# Patient Record
Sex: MALE | Race: WHITE
[De-identification: names, ages, dates, MRNs, and addresses within clinical notes are randomized per-mention and may not be internally consistent; named-entity substitution may affect disease eponyms.]

---

## 2019-12-30 ENCOUNTER — HOSPITAL ENCOUNTER (EMERGENCY)
Dept: HOSPITAL 25 - UCEAST | Age: 54
Discharge: HOME | End: 2019-12-30
Payer: COMMERCIAL

## 2019-12-30 VITALS — SYSTOLIC BLOOD PRESSURE: 150 MMHG | DIASTOLIC BLOOD PRESSURE: 87 MMHG

## 2019-12-30 DIAGNOSIS — I10: ICD-10-CM

## 2019-12-30 DIAGNOSIS — J18.9: Primary | ICD-10-CM

## 2019-12-30 PROCEDURE — 71046 X-RAY EXAM CHEST 2 VIEWS: CPT

## 2019-12-30 PROCEDURE — G0463 HOSPITAL OUTPT CLINIC VISIT: HCPCS

## 2019-12-30 PROCEDURE — 99212 OFFICE O/P EST SF 10 MIN: CPT

## 2019-12-30 NOTE — UC
Respiratory Complaint HPI





- HPI Summary


HPI Summary: 


54-year-old male comes in with a chief complaint of cough chest congestion and 

chills.  Patient's had upper respiratory tract infection symptoms for more than 

a month.  He was treated with Augmentin which did improve his symptoms over the 

never went away completely.  Last 2 days he started with all of the above 

symptoms.  Feels the congestion worse on the left side of his chest.  He does 

feel somewhat short of breath.  Denies any wheezing.





- History of Current Complaint


Chief Complaint: UCRespiratory


Stated Complaint: COUGH,CONGESTION


Time Seen by Provider: 12/30/19 21:18


Pain Intensity: 0





- Allergies/Home Medications


Allergies/Adverse Reactions: 


 Allergies











Allergy/AdvReac Type Severity Reaction Status Date / Time


 


No Known Allergies Allergy   Verified 12/30/19 21:26











Home Medications: 


 Home Medications





Dm/Acetaminophen/Doxylamine [Vicks Nyquil Cold & Flu N 15-6. mg] 1 cap PO 

QPM PRN 12/30/19 [History Confirmed 12/30/19]


Guaifenesin/Dextromethorphan [Mucinex Dm -30 mg Tablet] 1 tab PO Q12H 12/ 30/19 [History Confirmed 12/30/19]











PMH/Surg Hx/FS Hx/Imm Hx


Previously Healthy: Yes


Cardiovascular History: Hypertension





- Surgical History


Surgical History: None





- Family History


Known Family History: Positive: Non-Contributory





- Social History


Alcohol Use: Occasionally


Substance Use Type: None


Smoking Status (MU): Never Smoked Tobacco





Review of Systems


All Other Systems Reviewed And Are Negative: Yes


Constitutional: Positive: Fever, Other - SEE HPI


Skin: Positive: Negative


Eyes: Positive: Negative


ENT: Positive: Sore Throat, Nasal Discharge, Sinus Congestion


Respiratory: Positive: Shortness Of Breath, Cough, Other - SEE HPI


Cardiovascular: Positive: Negative


Gastrointestinal: Positive: Negative


Motor: Positive: Negative


Neurovascular: Positive: Negative


Musculoskeletal: Positive: Negative


Neurological: Positive: Negative


Psychological: Positive: Negative


Is Patient Immunocompromised?: No





Physical Exam


Triage Information Reviewed: Yes


Appearance: No Pain Distress, Well-Nourished, Ill-Appearing - MILD


Vital Signs: 


 Initial Vital Signs











Temp  100.2 F   12/30/19 21:21


 


Pulse  120   12/30/19 21:21


 


Resp  24   12/30/19 21:21


 


BP  150/87   12/30/19 21:21


 


Pulse Ox  95   12/30/19 21:21











Vital Signs Reviewed: Yes


Eye Exam: Normal


Eyes: Positive: Conjunctiva Clear


ENT: Positive: Pharyngeal erythema, Nasal congestion, Nasal drainage, TMs normal


Neck: Positive: Supple


Respiratory: Positive: Lungs clear, Normal breath sounds, No respiratory 

distress


Cardiovascular: Positive: Tachycardia


Musculoskeletal: Positive: Strength Intact, ROM Intact


Neurological: Positive: Alert, Muscle Tone Normal


Psychological: Positive: Age Appropriate Behavior


Skin Exam: Normal





Respiratory Course/Dx





- Course


Course Of Treatment: 


I discussed the chest x-ray with the patient.  I see an infiltrate on the right 

side.  Radiologist reading is pending.  Patient is not a smoker and so I'll 

treat patient treatment of pneumonia will be a commendations of days 

azithromycin and a third generation cephalosporin cefdinir.  Patient is follow-

up his primary care doctor.  We discussed that if he got worse or do not 

improve is good emergency department.





- Differential Dx/Diagnosis


Provider Diagnosis: 


 Pneumonia








Discharge ED





- Sign-Out/Discharge


Documenting (check all that apply): Patient Departure


All imaging exams completed and their final reports reviewed: No





- Discharge Plan


Condition: Stable


Disposition: HOME


Prescriptions: 


Azithromycin 250 mg PO DAILY #4 tablet


Cefdinir [Cefdinir 300 MG CAP] 300 mg PO BID #19 cap


Patient Education Materials:  Community Acquired Pneumonia (ED)


Referrals: 


Anahi Lyman MD [Primary Care Provider] - 


Additional Instructions: 


FOLLOW UP WITH YOUR DOCTOR.


GO TO THE EMERGENCY DEPARTMENT IF WORSE; FEVERS, SHORTNESS OF BREATH, YOU FEEL 

ILL OR ANY QUESTIONS OR CONCERNS.





- Billing Disposition and Condition


Condition: STABLE


Disposition: Home

## 2019-12-31 NOTE — UC
- Progress Note


Progress Note: 





wet read correct





Course/Dx





- Diagnoses


Provider Diagnoses: 


 Pneumonia








Discharge ED





- Sign-Out/Discharge


Documenting (check all that apply): Post-Discharge Follow Up


All imaging exams completed and their final reports reviewed: Yes





- Discharge Plan


Condition: Stable


Disposition: HOME


Prescriptions: 


Azithromycin 250 mg PO DAILY #4 tablet


Cefdinir [Cefdinir 300 MG CAP] 300 mg PO BID #19 cap


Patient Education Materials:  Community Acquired Pneumonia (ED)


Referrals: 


Anahi Lyman MD [Primary Care Provider] - 


Additional Instructions: 


FOLLOW UP WITH YOUR DOCTOR.


GO TO THE EMERGENCY DEPARTMENT IF WORSE; FEVERS, SHORTNESS OF BREATH, YOU FEEL 

ILL OR ANY QUESTIONS OR CONCERNS.





- Billing Disposition and Condition


Condition: STABLE


Disposition: Home